# Patient Record
Sex: OTHER/UNKNOWN | URBAN - METROPOLITAN AREA
[De-identification: names, ages, dates, MRNs, and addresses within clinical notes are randomized per-mention and may not be internally consistent; named-entity substitution may affect disease eponyms.]

---

## 2017-03-06 ENCOUNTER — OFFICE VISIT (OUTPATIENT)
Dept: FAMILY MEDICINE | Facility: CLINIC | Age: 82
End: 2017-03-06

## 2017-03-06 DIAGNOSIS — Z02.89 HEALTH EXAMINATION OF DEFINED SUBPOPULATION: Primary | ICD-10-CM

## 2017-03-06 LAB
BILIRUB UR QL: NORMAL
CLARITY: CLEAR
COLOR UR: YELLOW
GLUCOSE URINE: NORMAL MG/DL
HGB UR QL: NORMAL
KETONES UR QL: NORMAL MG/DL
NITRITE UR QL STRIP: NORMAL
PH UR STRIP: 6.5 PH (ref 5–7)
PROT UR QL: NORMAL MG/DL
SP GR UR STRIP: 1.01 (ref 1–1.03)
SPECIMEN VOL UR: NORMAL ML
UROBILINOGEN UR QL STRIP: 0.2 EU/DL (ref 0.2–1)
WBC #/AREA URNS HPF: NORMAL /[HPF]

## 2017-03-06 PROCEDURE — 81003 URINALYSIS AUTO W/O SCOPE: CPT | Performed by: FAMILY MEDICINE

## 2017-03-06 PROCEDURE — 99499 UNLISTED E&M SERVICE: CPT | Performed by: FAMILY MEDICINE

## 2017-03-06 NOTE — MR AVS SNAPSHOT
"              After Visit Summary   3/6/2017    [facility]    MRN: [id number]           Patient Information     Date Of Birth          1/1/1900        Visit Information        Provider Department      3/6/2017 10:00 AM Kait Crocker MD [facility]        Today's Diagnoses     Health examination of defined subpopulation    -  1       Follow-ups after your visit        Your next 10 appointments already scheduled     Mar 13, 2017  8:20 AM CDT   Dept. of Transportation with Kait Crocker MD   [facility] ([facility]    [address] [place] [id number] [phone]              Who to contact     If you have questions or need follow up information about today's clinic visit or your schedule please contact [facility] directly at [phone].  Normal or non-critical lab and imaging results will be communicated to you by MyChart, letter or phone within 4 business days after the clinic has received the results. If you do not hear from us within 7 days, please contact the clinic through MyChart or phone. If you have a critical or abnormal lab result, we will notify you by phone as soon as possible.  Submit refill requests through [employer] or call your pharmacy and they will forward the refill request to us. Please allow 3 business days for your refill to be completed.          Additional Information About Your Visit        MyChart Information     [employer] lets you send messages to your doctor, view your test results, renew your prescriptions, schedule appointments and more. To sign up, go to [email or web address] . Click on \"Log in\" on the left side of the screen, which will take you to the Welcome page. Then click on \"Sign up Now\" on the right side of the page.     You will be asked to enter the access code listed below, as well as some personal information. Please follow the " directions to create your username and password.     Your access code is: [email or web address]  Expires: 2017  1:09 PM     Your access code will  in 90 days. If you need help or a new code, please call your [place] clinic or [phone].        Care EveryWhere ID     This is your Care EveryWhere ID. This could be used by other organizations to access your [place] medical records  [id number]         Blood Pressure from Last 3 Encounters:   No data found for BP    Weight from Last 3 Encounters:   No data found for Wt              We Performed the Following     OH U/A W/O MICRO        Primary Care Provider    None Specified       No primary provider on file.        Thank you!     Thank you for choosing [facility]  for your care. Our goal is always to provide you with excellent care. Hearing back from our patients is one way we can continue to improve our services. Please take a few minutes to complete the written survey that you may receive in the mail after your visit with us. Thank you!             Your Updated Medication List - Protect others around you: Learn how to safely use, store and throw away your medicines at www.disposemymeds.org.      Notice  As of 3/6/2017  1:09 PM    You have not been prescribed any medications.

## 2017-03-06 NOTE — PROGRESS NOTES
"Form MCSA-5875 (revised 2015)                                       OMB No. [id number]  Expiration Date: 2018    MEDICAL EXAMINATION REPORT FORM  (FOR  MEDICAL CERTIFICATION)    SECTION 1.  Information (to be filled out by )    PERSONAL INFORMATION  Last Name: Chet  First Name: Ryan Dalton Initial: ANGELA  : 1952      Age: 64        Street Address: [address] [place] [address]   City: [place]   State/Province: [place]   Zip Code: [address]  's License Number: [id number]      Issuing State/Province: [place]       Phone: [phone]     Gender: male  E-mail (optional): [email or web address]  CLP/CDL Applicant Jeter*: yes   ID Verified by**:  Tomer Og CMA  Has your [employer] medical certificate ever been denied or issued for less than 2 years? NO   (*CLP/CDL Applicant/Jeter: See instructions for definitions)  (** ID verified By:Record what type of photo ID was used to verify the identity of the , e.g., CDL,'s license, passport)     HEALTH HISTORY  Have you ever had surgery? If \"yes\", please list and explain below. YES    Bilateral hernia repair about 15 years ago       Are you currently taking medications (prescription, over-the-counter, herbal remedies, diet supplements)? If \"yes,\" please describe below. YES    Acyclovir 400mg tablet,take 1 tablet 3 times daily as needed, Calcium carbonate take 2 tablets daily, Fish oil 1000mg take 1 capsule daily, Vitamin D 2000 units take 1 tablet daily       Do you have or have you ever had:     1. Head/ brain injuries or illnesses (e.g., concussion)    NO     2. Seizures, epilepsy?   NO     3. Eye problems (except glasses or contacts)?   NO     4. Ear and/or hearing problems   NO     5. Heart disease, heart attack; bypass, or other heart problems   NO     6. Pacemaker, stents, implantable devices, or other heart procedures   NO     7. High blood pressure   YES- in past related to alcohol use     8. " "High cholesterol   NO     9. Chronic (long-term) cough, shortness of breath, or other breathing problems   NO   10. Lung disease (e.g. asthma)   NO   11. Kidney problems, kidney stones, or pain/problems with urination   NO   12. Stomach, liver, or digestive problems   NO   13. Diabetes or blood sugar problems        Insulin Used?   NO    NO   14. Anxiety, depression, nervousness, other mental health problems   NO   15. Fainting or passing out   NO   16. Dizziness, headaches, numbness, tingling, or memory loss?   NO   17. Unexplained weight loss   NO   18. Stroke, mini-stroke (TIA), paralysis, or weakness   NO   19. Missing or limited use of arm, hand, finger, leg, foot, toe   NO   20. Neck or back problems   YES- Back issue in the past from accident resolved   21. Bone, muscle, joint or nerve problems   NO   22. Blood clots or bleeding problems   NO   23. Cancer   NO   24. Chronic (long-term) infection or other chronic diseases   NO   25. Sleep disorders, pauses in breathing while asleep, daytime sleepiness, loud snoring?   NO   26. Have you ever had a sleep test (e.g. sleep apnea)?   NO   27. Have you ever spent a night in the hospital?   YES- compression fracture T-11   28. Have you ever had a broken bone?   YES- T-11   29. Have you ever used or do you now use tobacco?   NO   30. Do you currently drink alcohol?   NO   31. Have you used an illegal substance within the past two years?   NO   32. Have you ever failed a drug test or been dependent on an illegal substance?   NO     Other health condition(s) not described above: NO    Did you answer \"yes\" to any of questions 1-32?  If so, please comment further on those health conditions below: YES    High blood pressure readings previously; was not treated with medication; normal since abstaining from alcohol in 2000, spent night in hospital for compression fracture of thoracic in 2012, broken left rib 4/3/13, fracture right hand/base of thumb 7/25/07              CMV " "'S SIGNATURE  I certify that the above information is accurate and complete. I understand that inaccurate, false or missing information may invalidate the examination and my Medical Examiner's Certificate, that submission of fraudulent or intentionally false information is a violation of 49CFR 390.35, and that submission of fraudulent or intentionally false information may subject me to civil or ciminal penalties under 49 .37 and 49  Appendices A and B.     's signature:____________________________        Date: 3/6/2017                                         Signature if printed       Section 2. Examination Report (to be filled out by the medical examiner)      HEALTH HISTORY REVIEW  Review and discuss pertinent  answers and any available medical records. 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        Patient has no chronic illnesses or current medical problems that interfere with driving safety. He is not currently taking any medications. He should be able to drive safely at this time.  We discussed the use of OTC medications and effects on driving safety. Medications for minor ailments that do not affect driving.             TESTING     Pulse rate: 69     Pulse rhythm regular: YES  Height: 5 feet 6.5 inches  Weight: 171 pounds    Blood Pressure  Blood Pressure: 158 Systolic  98 Diastolic  Sitting YES  Second Reading (optional) 134/86  Other Testing if indicated    none needed       Urinalysis  Urinalysis is required. Numerical readings must be recorded.  Urine Specimen Specific Gravity Protein Blood Sugar    1.015 NEGATIVE NEGATIVE NEGATIVE   Protein, blood or sugar in the urine may be an indication for further testing to rule out any underlying medical problem.    Vision  Standard is at least 20/40 acuity (Snellen) in each eye with or without correction. At " least 70  field of vision in horizontal meridian measured in each eye. The use of corrective lenses should be noted on the Medical Examiner's Certificate.    ACUITY UNCORRECTED CORRECTED HORIZONTAL FIELD OF VISION   Right Eye 20/25 N/A Greater than 70 degrees   Left Eye 20/20 N/A Greater than 70 degrees   Both Eyes 20/20 N/A      Applicant can recognize and distinguish among traffic control signals and devices showing red, green and carlton colors? Yes    Monocular vision: No    Referred to ophthalmologist or optometrist?  No    Received documentation from ophthalmologist or optometrist?  No    HEARING   Standard: Must first perceive forced whispered voice at not less than 5 feet OR average hearing loss of less than or equal to 40 dB, in better ear (with or without hearing aid).    Check if hearing aid used for test:  Neither    Whispered voice test:    Record the distance from the individual at which a forced whispered voice can first be heard:        Right Ear: greater than 5 feet                  Left Ear: greater than 5 feet             PHYSICAL EXAMINATION  The presence of a certain condition may not necessarily disqualify a , particularly if the condition is controlled adequately, is not likely to worsen, or is readily amenable to treatment. Even if a condition does not disqualify a , the Medical Examiner may consider deferring the  temporarily. Also, the  should be advised to take the necessary steps to correct the condition as soon as possible, particularly if neglecting the condition, could result in more serious illness that might affect driving.    Check the body systems for abnormalities.  BODY SYSTEM Normal or Abnormal   1. General  Normal   2. Skin Normal   3. Eyes Normal   4. Ears Normal   5. Mouth/throat Normal   6. Cardiovascular Normal   7. Lungs/chest Normal   8. Abdomen Normal   9. Genito-urinary System including hernias Normal   10. Back/Spine Normal   11.  Extremities/joints Normal   12. Neurological system including reflexes Normal   13. Gait Normal   14. Vascular system Normal     Discuss any abnormal answers in detail in the space below and indicate whether it would affect the 's ability to operate a CMV. Enter applicable item number before each comment.     Patient has a normal physical exam with good strength and dexterity. Normal prehension and coordination. No findings that would interfere with safe driving or are cause for concern.             Please complete only one of the following (Federal or State) Medical Examiner Determination sections:    MEDICAL EXAMINER DETERMINATION (Federal)  Use this section for examinations performed in accordance with the Federal Motor Carrier Safety Regulations (49 ..49):    Meets standards in 49 .41; qualifies for 2-year certificate            If the  meets the standards outlined in 49 .41, then complete a Medical Examiner's Certificate as stated in 49 .43(h), as appropriate.     I have performed this evaluation for certification. I have personally reviewed all available records and recorded information pertaining to this evaluation, and attest that to the best of my knowledge, I believe it to be true and correct.    Medical Examiner's Signature: _________________________________________                                                                                   (if printed)    Medical Examiner's Name: Kait Crocker MD  Medical Examiner's Address:   [address] [address]  [phone]  Dept: [phone]    Date Certificate Signed: 3/6/2017     Medical Examiner's State License, Certificate, or Registration Number: [id number]    Issuing State:  [place]    National Registry Number:  [id number]                 Medical Examiner's Certificate Expiration Date: 3/6/2019                          Date submitted to  registry: 3-6-17  Submitted by: Tomer Og CMA

## 2017-03-13 ENCOUNTER — OFFICE VISIT (OUTPATIENT)
Dept: FAMILY MEDICINE | Facility: CLINIC | Age: 82
End: 2017-03-13

## 2017-03-13 DIAGNOSIS — Z02.89 HEALTH EXAMINATION OF DEFINED SUBPOPULATION: Primary | ICD-10-CM

## 2017-03-13 LAB
BILIRUB UR QL: NORMAL
CLARITY: CLEAR
COLOR UR: YELLOW
GLUCOSE URINE: NORMAL MG/DL
HGB UR QL: NORMAL
KETONES UR QL: NORMAL MG/DL
NITRITE UR QL STRIP: NORMAL
PH UR STRIP: 5.5 PH (ref 5–7)
PROT UR QL: NORMAL MG/DL
SP GR UR STRIP: 1.01 (ref 1–1.03)
SPECIMEN VOL UR: NORMAL ML
UROBILINOGEN UR QL STRIP: 0.2 EU/DL (ref 0.2–1)
WBC #/AREA URNS HPF: NORMAL /[HPF]

## 2017-03-13 PROCEDURE — 99499 UNLISTED E&M SERVICE: CPT | Performed by: FAMILY MEDICINE

## 2017-03-13 PROCEDURE — 81003 URINALYSIS AUTO W/O SCOPE: CPT | Performed by: FAMILY MEDICINE

## 2017-03-13 NOTE — MR AVS SNAPSHOT
"              After Visit Summary   3/13/2017    [facility]    MRN: [id number]           Patient Information     Date Of Birth          1900        Visit Information        Provider Department      3/13/2017 8:20 AM Kait Crocker MD [facility]        Today's Diagnoses     Health examination of defined subpopulation    -  1       Follow-ups after your visit        Who to contact     If you have questions or need follow up information about today's clinic visit or your schedule please contact [facility] directly at [phone].  Normal or non-critical lab and imaging results will be communicated to you by MyChart, letter or phone within 4 business days after the clinic has received the results. If you do not hear from us within 7 days, please contact the clinic through [employer] or phone. If you have a critical or abnormal lab result, we will notify you by phone as soon as possible.  Submit refill requests through [employer] or call your pharmacy and they will forward the refill request to us. Please allow 3 business days for your refill to be completed.          Additional Information About Your Visit        MyChart Information     [employer] lets you send messages to your doctor, view your test results, renew your prescriptions, schedule appointments and more. To sign up, go to [email or web address] . Click on \"Log in\" on the left side of the screen, which will take you to the Welcome page. Then click on \"Sign up Now\" on the right side of the page.     You will be asked to enter the access code listed below, as well as some personal information. Please follow the directions to create your username and password.     Your access code is: [email or web address]  Expires: 2017  2:09 PM     Your access code will  in 90 days. If you need help or a new code, please call your [facility] or [phone].        Care EveryWhere ID     This is " your Care EveryWhere ID. This could be used by other organizations to access your [place] medical records  [id number]         Blood Pressure from Last 3 Encounters:   No data found for BP    Weight from Last 3 Encounters:   No data found for Wt              We Performed the Following     OH U/A W/O MICRO        Primary Care Provider    None Specified       No primary provider on file.        Thank you!     Thank you for choosing [facility]  for your care. Our goal is always to provide you with excellent care. Hearing back from our patients is one way we can continue to improve our services. Please take a few minutes to complete the written survey that you may receive in the mail after your visit with us. Thank you!             Your Updated Medication List - Protect others around you: Learn how to safely use, store and throw away your medicines at www.disposemymeds.org.      Notice  As of 3/13/2017  1:07 PM    You have not been prescribed any medications.

## 2017-03-13 NOTE — PROGRESS NOTES
"Form MCSA-5875 (revised 2015)                                       B No. [id number]  Expiration Date: 2018    MEDICAL EXAMINATION REPORT FORM  (FOR  MEDICAL CERTIFICATION)    SECTION 1.  Information (to be filled out by )    PERSONAL INFORMATION  Last Name: Fabienne  First Name: Michael Dalton Initial: JASMIN  : 1953      Age: 63        Street Address: [address] [place] [address]   City: [place]   State/Province: [place]   Zip Code: [address]  's License Number: [id number]      Issuing State/Province: [place]       Phone: [phone]     Gender: male  E-mail (optional): [email or web address]  CLP/CDL Applicant Jeter*: yes   ID Verified by**:  Tomer Og CMA  Has your [employer]OT/FMCSA medical certificate ever been denied or issued for less than 2 years? NO   (*CLP/CDL Applicant/Jeter: See instructions for definitions)  (** ID verified By:Record what type of photo ID was used to verify the identity of the , e.g., CDL,'s license, passport)     HEALTH HISTORY  Have you ever had surgery? If \"yes\", please list and explain below. YES    Left hernia surgery, bilateral rotator cuff surgery       Are you currently taking medications (prescription, over-the-counter, herbal remedies, diet supplements)? If \"yes,\" please describe below. YES    Multivitamin daily       Do you have or have you ever had:     1. Head/ brain injuries or illnesses (e.g., concussion)    NO     2. Seizures, epilepsy?   NO     3. Eye problems (except glasses or contacts)?   NO     4. Ear and/or hearing problems   NO     5. Heart disease, heart attack; bypass, or other heart problems   NO     6. Pacemaker, stents, implantable devices, or other heart procedures   NO     7. High blood pressure   NO     8. High cholesterol   NO     9. Chronic (long-term) cough, shortness of breath, or other breathing problems   NO   10. Lung disease (e.g. asthma)   NO   11. Kidney problems, kidney stones, or " "pain/problems with urination   YES- long time ago, now better   12. Stomach, liver, or digestive problems   NO   13. Diabetes or blood sugar problems        Insulin Used?   NO    NO   14. Anxiety, depression, nervousness, other mental health problems   NO   15. Fainting or passing out   NO   16. Dizziness, headaches, numbness, tingling, or memory loss?   NO   17. Unexplained weight loss   NO   18. Stroke, mini-stroke (TIA), paralysis, or weakness   NO   19. Missing or limited use of arm, hand, finger, leg, foot, toe   NO- none   20. Neck or back problems   YES- no significant limitations except lifting   21. Bone, muscle, joint or nerve problems   NO   22. Blood clots or bleeding problems   NO   23. Cancer   NO   24. Chronic (long-term) infection or other chronic diseases   NO   25. Sleep disorders, pauses in breathing while asleep, daytime sleepiness, loud snoring?   NO   26. Have you ever had a sleep test (e.g. sleep apnea)?   NO   27. Have you ever spent a night in the hospital?   NO   28. Have you ever had a broken bone?   YES- wrist   29. Have you ever used or do you now use tobacco?   NO   30. Do you currently drink alcohol?   YES- occasional    31. Have you used an illegal substance within the past two years?   NO   32. Have you ever failed a drug test or been dependent on an illegal substance?   NO     Other health condition(s) not described above: NO    Did you answer \"yes\" to any of questions 1-32?  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 I understand that inaccurate, false or missing information " "may invalidate the examination and my Medical Examiner's Certificate, that submission of fraudulent or intentionally false information is a violation of 49CFR 390.35, and that submission of fraudulent or intentionally false information may subject me to civil or ciminal penalties under 49 .37 and 49  Appendices A and B.     's signature:____________________________        Date: 3/13/2017                                         Signature if printed       Section 2. Examination Report (to be filled out by the medical examiner)      HEALTH HISTORY REVIEW  Review and discuss pertinent  answers and any available medical records. 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        Patient has no chronic illnesses or current medical problems that interfere with driving safety. He is not currently taking any medications. He should be able to drive safely at this time.  We discussed the use of OTC medications and effects on driving safety. Several injuries but no sequelae that affect driving safety. Has to limit weights he can lift.             TESTING     Pulse rate: 72     Pulse rhythm regular: YES  Height: 5 feet 9.75 inches  Weight: 170 pounds    Blood Pressure  Blood Pressure: 132 Systolic  77 Diastolic  Sitting YES  Second Reading (optional) none  Other Testing if indicated    none needed       Urinalysis  Urinalysis is required. Numerical readings must be recorded.  Urine Specimen Specific Gravity Protein Blood Sugar    1.010 NEGATIVE NEGATIVE NEGATIVE   Protein, blood or sugar in the urine may be an indication for further testing to rule out any underlying medical problem.    Vision  Standard is at least 20/40 acuity (Snellen) in each eye with or without correction. At least 70  field of vision in horizontal meridian measured in each eye. The use of corrective lenses should be " noted on the Medical Examiner's Certificate.    ACUITY UNCORRECTED CORRECTED HORIZONTAL FIELD OF VISION   Right Eye 20/25 N/A Greater than 70 degrees   Left Eye 20/25 N/A Greater than 70 degrees   Both Eyes 20/20 N/A      Applicant can recognize and distinguish among traffic control signals and devices showing red, green and carlton colors? Yes    Monocular vision: No    Referred to ophthalmologist or optometrist?  No    Received documentation from ophthalmologist or optometrist?  No    HEARING   Standard: Must first perceive forced whispered voice at not less than 5 feet OR average hearing loss of less than or equal to 40 dB, in better ear (with or without hearing aid).    Check if hearing aid used for test:  Neither    Whispered voice test:    Record the distance from the individual at which a forced whispered voice can first be heard:        Right Ear: greater than 5 feet                  Left Ear: greater than 5 feet             PHYSICAL EXAMINATION  The presence of a certain condition may not necessarily disqualify a , particularly if the condition is controlled adequately, is not likely to worsen, or is readily amenable to treatment. Even if a condition does not disqualify a , the Medical Examiner may consider deferring the  temporarily. Also, the  should be advised to take the necessary steps to correct the condition as soon as possible, particularly if neglecting the condition, could result in more serious illness that might affect driving.    Check the body systems for abnormalities.  BODY SYSTEM Normal or Abnormal   1. General  Normal   2. Skin Normal   3. Eyes Normal   4. Ears Normal   5. Mouth/throat Normal   6. Cardiovascular Normal   7. Lungs/chest Normal   8. Abdomen Normal   9. Genito-urinary System including hernias Normal   10. Back/Spine Normal   11. Extremities/joints Normal   12. Neurological system including reflexes Normal   13. Gait Normal   14. Vascular system Normal      Discuss any abnormal answers in detail in the space below and indicate whether it would affect the 's ability to operate a CMV. Enter applicable item number before each comment.       Patient has a normal physical exam with good strength and dexterity. Normal prehension and coordination. No findings that would interfere with safe driving or are cause for concern.             Please complete only one of the following (Federal or State) Medical Examiner Determination sections:    MEDICAL EXAMINER DETERMINATION (Federal)  Use this section for examinations performed in accordance with the Federal Motor Carrier Safety Regulations (49 ..49):    Meets standards in 49 .41; qualifies for 2-year certificate            If the  meets the standards outlined in 49 .41, then complete a Medical Examiner's Certificate as stated in 49 .43(h), as appropriate.     I have performed this evaluation for certification. I have personally reviewed all available records and recorded information pertaining to this evaluation, and attest that to the best of my knowledge, I believe it to be true and correct.    Medical Examiner's Signature: _________________________________________                                                                                   (if printed)    Medical Examiner's Name: Kait Crocker MD  Medical Examiner's Address:   [address] [address]  [phone]  Dept: [phone]    Date Certificate Signed: 3/13/2017     Medical Examiner's State License, Certificate, or Registration Number: [id number]    Issuing State:  [place]    National Registry Number:  [id number]                 Medical Examiner's Certificate Expiration Date: 3/13/2019                          Date submitted to registry: 3-13-17  Submitted by: Tomer Og CMA

## 2019-01-24 ENCOUNTER — OFFICE VISIT (OUTPATIENT)
Dept: FAMILY MEDICINE | Facility: CLINIC | Age: 84
End: 2019-01-24

## 2019-01-24 DIAGNOSIS — Z02.89 HEALTH EXAMINATION OF DEFINED SUBPOPULATION: Primary | ICD-10-CM

## 2019-01-24 LAB
BILIRUB UR QL: NORMAL
CLARITY: CLEAR
COLOR UR: YELLOW
GLUCOSE URINE: NORMAL MG/DL
HGB UR QL: NORMAL
KETONES UR QL: NORMAL MG/DL
NITRITE UR QL STRIP: NORMAL
PH UR STRIP: 6 PH (ref 5–7)
PROT UR QL: NORMAL MG/DL
SP GR UR STRIP: 1.02 (ref 1–1.03)
SPECIMEN VOL UR: NORMAL ML
UROBILINOGEN UR QL STRIP: 0.2 EU/DL (ref 0.2–1)
WBC #/AREA URNS HPF: NORMAL /[HPF]

## 2019-01-24 PROCEDURE — 81003 URINALYSIS AUTO W/O SCOPE: CPT | Performed by: PHYSICIAN ASSISTANT

## 2019-01-24 PROCEDURE — 99499 UNLISTED E&M SERVICE: CPT | Performed by: PHYSICIAN ASSISTANT

## 2019-01-24 NOTE — PROGRESS NOTES
"Form MCSA-5875                                  [facility] No. [id number]  Expiration Date: 2019  MEDICAL EXAMINATION REPORT FORM  (FOR  MEDICAL CERTIFICATION)    SECTION 1.  Information (to be filled out by )    PERSONAL INFORMATION    Last Name: Juan  First Name: Ryan Dalton Initial: ANGELA  : 1952      Age: 66        Street Address: [address] [place] [address] [address]   City: [place]   State/Province: [place]   Zip Code: [address]  's License Number: [id number]      Issuing State/Province: [place]       Phone: [phone]     Gender: male  E-mail (optional): [email or web address]  CLP/CDL Applicant Jeter*: yes   ID Verified by**:  Teresa Ambriz MA   Has your USDOT/FMCSA medical certificate ever been denied or issued for less than 2 years? NO     (*CLP/CDL Applicant/Jeter: See instructions for definitions)  (** ID verified By:Record what type of photo ID was used to verify the identity of the , e.g., CDL,'s license, passport)       HEALTH HISTORY    Have you ever had surgery? If \"yes\", please list and explain below.  [ x ] Yes [  ]  No  [  ] Not Sure    Hernia (leaft and right)     Are you currently taking medications (prescription, over-the-counter, herbal remedies, diet supplements)? If \"yes,\" please describe below.   [x  ] Yes [  ]  No  [  ] Not Sure    Calcium, vitamin D, Fish oil, Atrovastatin (liptior)       HEALTH HISTORY (continued)          Do you have or have you ever had:                                                     Not  Yes    No     Sure                                                                          Not    Yes     No   Sure    1. Head/brain injuries or illness (e.g., concussion)  x     16. Dizziness, headaches, numbness, tingling, or memory loss  x       2. Seizures, epilepsy  x     17. Unexplained weight loss  x       3. Eye problems (except glasses or contacts)  x     18. Stroke, mini stroke (TIA), paralysis, or weakness  x     " "  4. Ear and/or hearing problems  x     19. Missing or limited use of arm, hand, finger, leg, foot, toe  x       5. Heart disease, heart attack, bypass, or other heart problems  x     20. Neck or back problems x        6. Pacemaker, stents, implantable devices, or other heart procedures  x     21. Bone, muscle, joint or nerve problems  x       7. High blood preassure x      22. Blood clots or bleeding problems  x       8. High cholesterol x      23. Cancer  x       9. Chronic (long term) cough, shortness of breath, or other breathing problems  x     24. Chronic (long term) infection or other chronic disease  x       10. Lung disease (e.g., asthma)  x     25. Sleep disorders, pauses in breathing while asleep, daytime sleepiness, loud snoring  x       11. Kidney problems, kidney stones, or pain/problems with urination  x     26. Have you ever had a sleep test? (e.g., sleep apnea)  x       12. Stomach, liver, or digestive problems  x     27. Have you ever spent the night in the hospital? x        13. Diabetes or blood sugar problems  x     28. Have you ever had a broken bone? x              Insulin used  x     29. Have you ever used or do you now use tobacco?  x       14. Anxiety, depression, nervousness, other mental health problems  x     30. Do you currently drink alcohol?   x       15. Fainting or passing out  x     31.  Have you used an illegal substance within the past two years?   x         32. Have you ever failed a drug test or been dependent on an illegal substance?   x         Other health condition(s) not described above: [  ] Yes [ x ]  No  [  ] Not Sure         Did you answer \"yes\" to any of questions 1-32?  If so, please comment further on those health conditions below: [x  ] Yes [  ]  No  [  ] Not Sure    7. Prior to quitting alcohol 18 year ago  8. borderline, now using Lipitor  20. compression fracture 2012 27, 28. see #20            'S SIGNATURE  I certify that the above information is " "accurate and complete. I understand that inaccurate, false or missing information may invalidate the examination and my Medical Examiner's Certificate, that submission of fraudulent or intentionally false information is a violation of 49CFR 390.35, and that submission of fraudulent or intentionally false information may subject me to civil or ciminal penalties under 49 .37 and 49  Appendices A and B.     's signature:____________________________        Date: 1/24/2019                                         Signature if printed       Section 2. Examination Report (to be filled out by the medical examiner)      HEALTH HISTORY REVIEW  Review and discuss pertinent  answers and any available medical records. 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        hyperlipidemia treated with Lipitor  BP is within normal ranges, not on BP medication for over 18 years             TESTING     Pulse rate: 90     Pulse rhythm regular: YES  Height: 5 feet 6.25 inches  Weight: 173.8 pounds    Blood Pressure  Blood Pressure: 153 Systolic  83 Diastolic  Sitting yes  Second Reading (optional) 129/81  Other Testing if indicated    none       Urinalysis  Urinalysis is required. Numerical readings must be recorded.  Urine Specimen Specific Gravity Protein Blood Sugar    1.020 NEGATIVE NEGATIVE NEGATIVE   Protein, blood or sugar in the urine may be an indication for further testing to rule out any underlying medical problem.    Vision  Standard is at least 20/40 acuity (Snellen) in each eye with or without correction. At least 70  field of vision in horizontal meridian measured in each eye. The use of corrective lenses should be noted on the Medical Examiner's Certificate.    ACUITY UNCORRECTED CORRECTED HORIZONTAL FIELD OF VISION   Right Eye 20/40 N/A Greater than 70 degrees   Left Eye 20/25 N/A " Greater than 70 degrees   Both Eyes 20/32 N/A      Applicant can recognize and distinguish among traffic control signals and devices showing red, green and carlton colors? Yes    Monocular vision: No    Referred to ophthalmologist or optometrist?  No    Received documentation from ophthalmologist or optometrist?  No    HEARING   Standard: Must first perceive forced whispered voice at not less than 5 feet OR average hearing loss of less than or equal to 40 dB, in better ear (with or without hearing aid).    Check if hearing aid used for test:  Neither    Whispered voice test:    Record the distance from the individual at which a forced whispered voice can first be heard:        Right Ear: greater than 5 feet                  Left Ear: greater than 5 feet             PHYSICAL EXAMINATION  The presence of a certain condition may not necessarily disqualify a , particularly if the condition is controlled adequately, is not likely to worsen, or is readily amenable to treatment. Even if a condition does not disqualify a , the Medical Examiner may consider deferring the  temporarily. Also, the  should be advised to take the necessary steps to correct the condition as soon as possible, particularly if neglecting the condition, could result in more serious illness that might affect driving.    Check the body systems for abnormalities.  BODY SYSTEM Normal or Abnormal   1. General  Normal   2. Skin Normal   3. Eyes Normal   4. Ears Normal   5. Mouth/throat Normal   6. Cardiovascular Normal   7. Lungs/chest Normal   8. Abdomen Normal   9. Genito-urinary System including hernias Normal   10. Back/Spine Normal   11. Extremities/joints Normal   12. Neurological system including reflexes Normal   13. Gait Normal   14. Vascular system Normal     Discuss any abnormal answers in detail in the space below and indicate whether it would affect the 's ability to operate a CMV. Enter applicable item number before  each comment.     normal exam            Please complete only one of the following (Federal or State) Medical Examiner Determination sections:    MEDICAL EXAMINER DETERMINATION (Federal)  Use this section for examinations performed in accordance with the Federal Motor Carrier Safety Regulations (49 ..49):    Meets standards in 49 .41; qualifies for 2-year certificate            If the  meets the standards outlined in 49 .41, then complete a Medical Examiner's Certificate as stated in 49 .43(h), as appropriate.     I have performed this evaluation for certification. I have personally reviewed all available records and recorded information pertaining to this evaluation, and attest that to the best of my knowledge, I believe it to be true and correct.    Medical Examiner's Signature: _________________________________________                                                                                   (if printed)    Medical Examiner's Name: Shyanne Mathews PA-C  Medical Examiner's Address:   [address] [address]  [phone]  Dept: [phone]    Date Certificate Signed: 1/24/19    Medical Examiner's State License, Certificate, or Registration Number: [id number]    Issuing State:  [place]    Physician Assistant    National Registry Number:  [id number]                Medical Examiner's Certificate Expiration Date: 01/24/2021                          Date submitted to registry: 1/24/19  Submitted by: Blank BAH

## 2019-02-28 ENCOUNTER — OFFICE VISIT (OUTPATIENT)
Dept: FAMILY MEDICINE | Facility: CLINIC | Age: 84
End: 2019-02-28

## 2019-02-28 DIAGNOSIS — Z02.89 HEALTH EXAMINATION OF DEFINED SUBPOPULATION: Primary | ICD-10-CM

## 2019-02-28 LAB
BILIRUB UR QL: NORMAL
CLARITY: CLEAR
COLOR UR: YELLOW
GLUCOSE URINE: NORMAL MG/DL
HGB UR QL: NORMAL
KETONES UR QL: NORMAL MG/DL
NITRITE UR QL STRIP: NORMAL
PH UR STRIP: 7 PH (ref 5–7)
PROT UR QL: NORMAL MG/DL
SP GR UR STRIP: 1 (ref 1–1.03)
SPECIMEN VOL UR: NORMAL ML
UROBILINOGEN UR QL STRIP: 0.2 EU/DL (ref 0.2–1)
WBC #/AREA URNS HPF: NORMAL /[HPF]

## 2019-02-28 PROCEDURE — 81003 URINALYSIS AUTO W/O SCOPE: CPT | Performed by: PHYSICIAN ASSISTANT

## 2019-02-28 PROCEDURE — 99499 UNLISTED E&M SERVICE: CPT | Performed by: PHYSICIAN ASSISTANT

## 2019-02-28 NOTE — PROGRESS NOTES
"Form [id number]                                  [facility] [id number]  Expiration Date: 2019  MEDICAL EXAMINATION REPORT FORM  (FOR  MEDICAL CERTIFICATION)    SECTION 1.  Information (to be filled out by )    PERSONAL INFORMATION    Last Name: Fabienne  First Name: Michael Dalton Initial: JASMIN  : 1953      Age: 65        Street Address: [address] [place] [address] [employer]  City: [place]   State/Province: [place]   Zip Code: [address]  's License Number: [id number]      Issuing State/Province: [place]       Phone: [phone]     Gender: male  E-mail (optional): [email or web address]  CLP/CDL Applicant Jeter*: yes   ID Verified by**:  Teresa REINOSO   Has your [employer] medical certificate ever been denied or issued for less than 2 years? NO     (*CLP/CDL Applicant/Jeter: See instructions for definitions)  (** ID verified By:Record what type of photo ID was used to verify the identity of the , e.g., CDL,'s license, passport)       HEALTH HISTORY    Have you ever had surgery? If \"yes\", please list and explain below.  [ x ] Yes [  ]  No  [  ] Not Sure    Left hernia surgery, bilateral rotator cuff surgery     Are you currently taking medications (prescription, over-the-counter, herbal remedies, diet supplements)? If \"yes,\" please describe below.   [  ] Yes [ x ]  No  [  ] Not Sure          HEALTH HISTORY (continued)          Do you have or have you ever had:                                                     Not  Yes    No     Sure                                                                          Not    Yes     No   Sure    1. Head/brain injuries or illness (e.g., concussion)  x     16. Dizziness, headaches, numbness, tingling, or memory loss  x       2. Seizures, epilepsy  x     17. Unexplained weight loss  x       3. Eye problems (except glasses or contacts)  x     18. Stroke, mini stroke (TIA), paralysis, or weakness  x       4. Ear and/or hearing " "problems  x     19. Missing or limited use of arm, hand, finger, leg, foot, toe  x       5. Heart disease, heart attack, bypass, or other heart problems  x     20. Neck or back problems  x       6. Pacemaker, stents, implantable devices, or other heart procedures  x     21. Bone, muscle, joint or nerve problems  x       7. High blood preassure  x     22. Blood clots or bleeding problems  x       8. High cholesterol  x     23. Cancer  x       9. Chronic (long term) cough, shortness of breath, or other breathing problems  x     24. Chronic (long term) infection or other chronic disease  x       10. Lung disease (e.g., asthma)  x     25. Sleep disorders, pauses in breathing while asleep, daytime sleepiness, loud snoring  x       11. Kidney problems, kidney stones, or pain/problems with urination  x     26. Have you ever had a sleep test? (e.g., sleep apnea)  x       12. Stomach, liver, or digestive problems  x     27. Have you ever spent the night in the hospital?  x       13. Diabetes or blood sugar problems  x     28. Have you ever had a broken bone? x              Insulin used  x     29. Have you ever used or do you now use tobacco?  x       14. Anxiety, depression, nervousness, other mental health problems  x     30. Do you currently drink alcohol?   x       15. Fainting or passing out  x     31.  Have you used an illegal substance within the past two years?   x         32. Have you ever failed a drug test or been dependent on an illegal substance?   x         Other health condition(s) not described above: [  ] Yes [x  ]  No  [  ] Not Sure         Did you answer \"yes\" to any of questions 1-32?  If so, please comment further on those health conditions below: [x  ] Yes [  ]  No  [  ] Not Sure    Right wrist            'S SIGNATURE  I certify that the above information is accurate and complete. I understand that inaccurate, false or missing information may invalidate the examination and my Medical Examiner's " "Certificate, that submission of fraudulent or intentionally false information is a violation of 49CFR 390.35, and that submission of fraudulent or intentionally false information may subject me to civil or ciminal penalties under 49 .37 and 49  Appendices A and B.     's signature:____________________________        Date: 2/28/2019                                         Signature if printed       Section 2. Examination Report (to be filled out by the medical examiner)      HEALTH HISTORY REVIEW  Review and discuss pertinent  answers and any available medical records. 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        patient reports no daily medications or chronic conditions that would interfere with driving safety            TESTING     Pulse rate: 61     Pulse rhythm regular: YES  Height: 5 feet 10 inches  Weight: 173.8 pounds    Blood Pressure  Blood Pressure: 119 Systolic  74 Diastolic  Sitting yes  Second Reading (optional) n/a  Other Testing if indicated    none       Urinalysis  Urinalysis is required. Numerical readings must be recorded.  Urine Specimen Specific Gravity Protein Blood Sugar    1.005 NEGATIVE TRACE NEGATIVE   Protein, blood or sugar in the urine may be an indication for further testing to rule out any underlying medical problem.    Vision  Standard is at least 20/40 acuity (Snellen) in each eye with or without correction. At least 70  field of vision in horizontal meridian measured in each eye. The use of corrective lenses should be noted on the Medical Examiner's Certificate.    ACUITY UNCORRECTED CORRECTED HORIZONTAL FIELD OF VISION   Right Eye 20/20 N/A Greater than 70 degrees   Left Eye 20/25 N/A Greater than 70 degrees   Both Eyes 20/20 N/A      Applicant can recognize and distinguish among traffic control signals and devices showing red, green " and carlton colors? Yes    Monocular vision: No    Referred to ophthalmologist or optometrist?  No    Received documentation from ophthalmologist or optometrist?  No    HEARING   Standard: Must first perceive forced whispered voice at not less than 5 feet OR average hearing loss of less than or equal to 40 dB, in better ear (with or without hearing aid).    Check if hearing aid used for test:  Neither    Audiometry testing:    Record hearing loss in decibels. (according to ANSI Z24.5-1951):    Right Ear   Left Ear       500 Hz:   0 decibel loss  500 Hz:   0 decibel loss   1000 Hz: 0 decibel loss  1000 Hz: 0 decibel loss   2000 Hz: 0 decibel loss  2000 Hz: 20 decibel loss       Average: 0 decibel loss Average: 6.6 decibel loss            PHYSICAL EXAMINATION  The presence of a certain condition may not necessarily disqualify a , particularly if the condition is controlled adequately, is not likely to worsen, or is readily amenable to treatment. Even if a condition does not disqualify a , the Medical Examiner may consider deferring the  temporarily. Also, the  should be advised to take the necessary steps to correct the condition as soon as possible, particularly if neglecting the condition, could result in more serious illness that might affect driving.    Check the body systems for abnormalities.  BODY SYSTEM Normal or Abnormal   1. General  Normal   2. Skin Normal   3. Eyes Normal   4. Ears Abnormal   5. Mouth/throat Normal   6. Cardiovascular Normal   7. Lungs/chest Normal   8. Abdomen Normal   9. Genito-urinary System including hernias Normal   10. Back/Spine Normal   11. Extremities/joints Normal   12. Neurological system including reflexes Normal   13. Gait Normal   14. Vascular system Normal     Discuss any abnormal answers in detail in the space below and indicate whether it would affect the 's ability to operate a CMV. Enter applicable item number before each comment.     slight  hearing loss in left ear- passes the standards            Please complete only one of the following (Federal or State) Medical Examiner Determination sections:    MEDICAL EXAMINER DETERMINATION (Federal)  Use this section for examinations performed in accordance with the Federal Motor Carrier Safety Regulations (49 ..49):    Meets standards in 49 .41; qualifies for 2-year certificate            If the  meets the standards outlined in 49 .41, then complete a Medical Examiner's Certificate as stated in 49 .43(h), as appropriate.     I have performed this evaluation for certification. I have personally reviewed all available records and recorded information pertaining to this evaluation, and attest that to the best of my knowledge, I believe it to be true and correct.    Medical Examiner's Signature: _________________________________________                                                                                   (if printed)    Medical Examiner's Name: Shyanne Mathews PA-C  Medical Examiner's Address:   [address] [address]  [phone]  Dept: [phone]    Date Certificate Signed: 02/28/2019    Medical Examiner's State License, Certificate, or Registration Number: [id number]    Issuing State:  [place]    Physician Assistant    National Registry Number:  [id number]   Medical Examiner's Certificate Expiration Date: 02/28/2021                          Date submitted to registry: 02/28/2019  Submitted by: Teresa Ambriz CMA